# Patient Record
Sex: FEMALE | Race: ASIAN | NOT HISPANIC OR LATINO
[De-identification: names, ages, dates, MRNs, and addresses within clinical notes are randomized per-mention and may not be internally consistent; named-entity substitution may affect disease eponyms.]

---

## 2020-09-17 PROBLEM — Z00.00 ENCOUNTER FOR PREVENTIVE HEALTH EXAMINATION: Status: ACTIVE | Noted: 2020-09-17

## 2020-09-18 ENCOUNTER — APPOINTMENT (OUTPATIENT)
Dept: CARDIOLOGY | Facility: CLINIC | Age: 65
End: 2020-09-18
Payer: MEDICAID

## 2020-09-18 ENCOUNTER — NON-APPOINTMENT (OUTPATIENT)
Age: 65
End: 2020-09-18

## 2020-09-18 VITALS
BODY MASS INDEX: 26.4 KG/M2 | TEMPERATURE: 98 F | DIASTOLIC BLOOD PRESSURE: 70 MMHG | HEART RATE: 69 BPM | SYSTOLIC BLOOD PRESSURE: 126 MMHG | HEIGHT: 60.5 IN | OXYGEN SATURATION: 99 % | RESPIRATION RATE: 18 BRPM | WEIGHT: 138 LBS

## 2020-09-18 DIAGNOSIS — R06.02 SHORTNESS OF BREATH: ICD-10-CM

## 2020-09-18 DIAGNOSIS — R00.2 PALPITATIONS: ICD-10-CM

## 2020-09-18 PROCEDURE — 93306 TTE W/DOPPLER COMPLETE: CPT

## 2020-09-18 PROCEDURE — 99204 OFFICE O/P NEW MOD 45 MIN: CPT

## 2020-09-18 PROCEDURE — 93000 ELECTROCARDIOGRAM COMPLETE: CPT

## 2020-09-18 RX ORDER — ATORVASTATIN CALCIUM 20 MG/1
20 TABLET, FILM COATED ORAL
Refills: 0 | Status: ACTIVE | COMMUNITY

## 2020-09-18 RX ORDER — TELMISARTAN 40 MG/1
40 TABLET ORAL
Refills: 0 | Status: ACTIVE | COMMUNITY

## 2020-09-18 RX ORDER — ATORVASTATIN CALCIUM 20 MG/1
20 TABLET, FILM COATED ORAL DAILY
Qty: 30 | Refills: 5 | Status: ACTIVE | COMMUNITY
Start: 1900-01-01 | End: 1900-01-01

## 2020-09-18 RX ORDER — METOPROLOL SUCCINATE 25 MG/1
25 TABLET, EXTENDED RELEASE ORAL DAILY
Qty: 30 | Refills: 5 | Status: ACTIVE | COMMUNITY
Start: 1900-01-01 | End: 1900-01-01

## 2020-09-18 RX ORDER — TELMISARTAN 40 MG/1
40 TABLET ORAL
Qty: 30 | Refills: 5 | Status: ACTIVE | COMMUNITY
Start: 1900-01-01 | End: 1900-01-01

## 2020-09-18 RX ORDER — ASPIRIN ENTERIC COATED TABLETS 81 MG 81 MG/1
81 TABLET, DELAYED RELEASE ORAL
Refills: 0 | Status: ACTIVE | COMMUNITY

## 2020-09-18 RX ORDER — AMLODIPINE BESYLATE 5 MG/1
5 TABLET ORAL
Refills: 0 | Status: ACTIVE | COMMUNITY

## 2020-09-18 RX ORDER — METOPROLOL SUCCINATE 25 MG/1
25 TABLET, EXTENDED RELEASE ORAL
Refills: 0 | Status: ACTIVE | COMMUNITY

## 2020-09-18 RX ORDER — AMIODARONE HYDROCHLORIDE 200 MG/1
200 TABLET ORAL
Refills: 0 | Status: ACTIVE | COMMUNITY

## 2020-09-18 RX ORDER — AMIODARONE HYDROCHLORIDE 200 MG/1
200 TABLET ORAL
Qty: 30 | Refills: 5 | Status: ACTIVE | COMMUNITY
Start: 1900-01-01 | End: 1900-01-01

## 2020-09-18 RX ORDER — AMLODIPINE BESYLATE 5 MG/1
5 TABLET ORAL DAILY
Qty: 30 | Refills: 5 | Status: ACTIVE | COMMUNITY
Start: 1900-01-01 | End: 1900-01-01

## 2020-09-20 PROBLEM — R06.02 SHORTNESS OF BREATH: Status: ACTIVE | Noted: 2020-09-18

## 2020-09-22 NOTE — REASON FOR VISIT
[FreeTextEntry1] : 65 year-old British female with HTN, CAD presents for evaluation of CP. Patient reports CP and SOB with exertion. She reports palpitations. Patient denies h/o syncope. Patient is on Amiodarone 200 mg, Amlodipine 5 mg, ASA 81 mg, Metoprolol ER 25 mg, Telmisartan 40 mg. Atorvastatin 20 mg. I advised patient to undergo an echocardiogram. I advised patient to wear a Holter monitor.

## 2020-09-22 NOTE — PHYSICAL EXAM
[General Appearance - Well Developed] : well developed [Normal Appearance] : normal appearance [Well Groomed] : well groomed [General Appearance - Well Nourished] : well nourished [No Deformities] : no deformities [General Appearance - In No Acute Distress] : no acute distress [Normal Conjunctiva] : the conjunctiva exhibited no abnormalities [Eyelids - No Xanthelasma] : the eyelids demonstrated no xanthelasmas [Normal Oral Mucosa] : normal oral mucosa [No Oral Pallor] : no oral pallor [No Oral Cyanosis] : no oral cyanosis [Normal Jugular Venous A Waves Present] : normal jugular venous A waves present [Normal Jugular Venous V Waves Present] : normal jugular venous V waves present [No Jugular Venous Grover A Waves] : no jugular venous grover A waves [Heart Rate And Rhythm] : heart rate and rhythm were normal [Heart Sounds] : normal S1 and S2 [Murmurs] : no murmurs present [Respiration, Rhythm And Depth] : normal respiratory rhythm and effort [Exaggerated Use Of Accessory Muscles For Inspiration] : no accessory muscle use [Auscultation Breath Sounds / Voice Sounds] : lungs were clear to auscultation bilaterally [Abdomen Soft] : soft [Abdomen Tenderness] : non-tender [Abdomen Mass (___ Cm)] : no abdominal mass palpated [Abnormal Walk] : normal gait [Gait - Sufficient For Exercise Testing] : the gait was sufficient for exercise testing [Nail Clubbing] : no clubbing of the fingernails [Cyanosis, Localized] : no localized cyanosis [Petechial Hemorrhages (___cm)] : no petechial hemorrhages [] : no ischemic changes [Oriented To Time, Place, And Person] : oriented to person, place, and time [Affect] : the affect was normal [Mood] : the mood was normal [No Anxiety] : not feeling anxious

## 2020-09-23 ENCOUNTER — NON-APPOINTMENT (OUTPATIENT)
Age: 65
End: 2020-09-23

## 2024-06-06 PROBLEM — E78.5 HLD (HYPERLIPIDEMIA): Status: ACTIVE | Noted: 2020-09-18

## 2024-06-06 PROBLEM — I48.0 PAROXYSMAL ATRIAL FIBRILLATION: Status: ACTIVE | Noted: 2020-09-20

## 2024-06-06 PROBLEM — I10 HTN (HYPERTENSION): Status: ACTIVE | Noted: 2020-09-18

## 2024-06-07 ENCOUNTER — APPOINTMENT (OUTPATIENT)
Dept: CARDIOLOGY | Facility: CLINIC | Age: 69
End: 2024-06-07
Payer: MEDICAID

## 2024-06-07 ENCOUNTER — NON-APPOINTMENT (OUTPATIENT)
Age: 69
End: 2024-06-07

## 2024-06-07 VITALS
DIASTOLIC BLOOD PRESSURE: 70 MMHG | RESPIRATION RATE: 18 BRPM | SYSTOLIC BLOOD PRESSURE: 124 MMHG | HEART RATE: 52 BPM | OXYGEN SATURATION: 97 % | BODY MASS INDEX: 25.93 KG/M2 | WEIGHT: 135 LBS

## 2024-06-07 DIAGNOSIS — E78.5 HYPERLIPIDEMIA, UNSPECIFIED: ICD-10-CM

## 2024-06-07 DIAGNOSIS — I48.0 PAROXYSMAL ATRIAL FIBRILLATION: ICD-10-CM

## 2024-06-07 DIAGNOSIS — I10 ESSENTIAL (PRIMARY) HYPERTENSION: ICD-10-CM

## 2024-06-07 PROCEDURE — 93000 ELECTROCARDIOGRAM COMPLETE: CPT

## 2024-06-07 PROCEDURE — 93306 TTE W/DOPPLER COMPLETE: CPT

## 2024-06-07 PROCEDURE — 99204 OFFICE O/P NEW MOD 45 MIN: CPT | Mod: 25

## 2024-06-11 NOTE — REASON FOR VISIT
[FreeTextEntry1] : 68 year-old [place] female with CAD, PAF, HTN, presents for followup.    Patient was last seen on 9/18/20 for evaluation of CP.  Patient reports CP and SOB with exertion. She reports palpitations. Patient denies h/o syncope. Patient is on Amiodarone 200 mg, Amlodipine 5 mg, ASA 81 mg, Metoprolol ER 25 mg, Telmisartan 40 mg. Atorvastatin 20 mg. I advised patient to undergo an echocardiogram. I advised patient to wear a Holter monitor.  Patient underwent an echocardiogram and it showed normal LV function without significant valvular pathology.  Patient wore a Holter and it showed occasional APC's, without significant arrhythmia. No PAF noted.  Patient is on Amiodarone 200 mg, Amlodipine 5 mg, ASA 81 mg, Metoprolol ER 25 mg, Telmisartan 40 mg. Atorvastatin 20 mg.

## 2024-06-11 NOTE — HISTORY OF PRESENT ILLNESS
[FreeTextEntry1] : 6/7/24- Patient denies CP or SOB. He reports palpitations but is okay. He reports dizziness and fatigue. I advised patient to stop taking Amiordarone and start on Multaq 400 mg BID for one month. I advised patient to consider ablation if Multaq 400 mg BID does not work. I advised patient to undergo an echocardiogram.  FU in one month.   9/18/20 - Patient reports CP and SOB with exertion. She reports palpitations. Patient denies h/o syncope. Patient is on Amiodarone 200 mg, Amlodipine 5 mg, ASA 81 mg, Metoprolol ER 25 mg, Telmisartan 40 mg. Atorvastatin 20 mg. I advised patient to undergo an echocardiogram. I advised patient to wear a Holter monitor.  Patient underwent an echocardiogram and it showed normal LV function without significant valvular pathology.  Patient wore a Holter and it showed occasional APC's, without significant arrhythmia. No PAF noted.

## 2024-07-05 ENCOUNTER — APPOINTMENT (OUTPATIENT)
Dept: CARDIOLOGY | Facility: CLINIC | Age: 69
End: 2024-07-05
Payer: MEDICAID

## 2024-07-05 ENCOUNTER — APPOINTMENT (OUTPATIENT)
Dept: CARDIOLOGY | Facility: CLINIC | Age: 69
End: 2024-07-05

## 2024-07-05 VITALS
OXYGEN SATURATION: 99 % | BODY MASS INDEX: 25.36 KG/M2 | RESPIRATION RATE: 16 BRPM | WEIGHT: 132 LBS | HEART RATE: 90 BPM | DIASTOLIC BLOOD PRESSURE: 58 MMHG | SYSTOLIC BLOOD PRESSURE: 107 MMHG

## 2024-07-05 DIAGNOSIS — R06.02 SHORTNESS OF BREATH: ICD-10-CM

## 2024-07-05 DIAGNOSIS — I48.0 PAROXYSMAL ATRIAL FIBRILLATION: ICD-10-CM

## 2024-07-05 DIAGNOSIS — I10 ESSENTIAL (PRIMARY) HYPERTENSION: ICD-10-CM

## 2024-07-05 PROCEDURE — 99214 OFFICE O/P EST MOD 30 MIN: CPT

## 2024-07-05 PROCEDURE — 93015 CV STRESS TEST SUPVJ I&R: CPT

## 2024-07-05 RX ORDER — LOSARTAN POTASSIUM 50 MG/1
50 TABLET, FILM COATED ORAL
Refills: 0 | Status: ACTIVE | COMMUNITY

## 2024-07-05 RX ORDER — DRONEDARONE 400 MG/1
400 TABLET, FILM COATED ORAL TWICE DAILY
Qty: 60 | Refills: 5 | Status: ACTIVE | COMMUNITY
Start: 2024-07-05 | End: 1900-01-01

## 2024-08-05 PROBLEM — I42.2 APICAL VARIANT HYPERTROPHIC CARDIOMYOPATHY: Status: ACTIVE | Noted: 2024-08-05

## 2024-08-06 ENCOUNTER — APPOINTMENT (OUTPATIENT)
Dept: CARDIOLOGY | Facility: CLINIC | Age: 69
End: 2024-08-06

## 2024-08-06 PROCEDURE — 99214 OFFICE O/P EST MOD 30 MIN: CPT

## 2024-08-06 NOTE — REASON FOR VISIT
[FreeTextEntry1] : 69 year-old German female with CAD, PAF, apical hypertrophic cardiomyopathy, HTN, presents for followup.    Patient was last seen on 7/5/24 -  Pt is here for follow up. Pt reports improvement after stopped taking Amiodarone. Patient denies CP, SOB, palpitations, or lightheadedness. Patient denies h/o syncope. Pt denies any bleeding issues with ASA.  Pt is on Amlodipine 5 mg, ASA 81 mg, Metoprolol ER 25 mg, Losartan 50 mg. Atorvastatin 20 mg.  Her home SBP ranged 105-115, HR ranged 52-58 bpm. Today's /58 P 57.  Patient has low BP. I advised patient to change Metoprolol ER 25 mg to Multaq 400 mg BID and decrease dose of Amlodipine to 2.5mg. Patient may need PPM in the future. I advised patient to undergo stress test.   Patient underwent a treadmill stress test and completed 6 minutes of Matti protocol.  There was no ECG evidence of ischemia.  Following treadmill stress, there was no echocardiographic evidence of ischemia.  However, patient was unable to reach target HR.  There appeared to be hypertrophy of the apex, distal inferior, and distal lateral walls.  Pt is on Amlodipine 2.5 mg for HTN.  She is on ASA 81 mg, Multaq 400 mg BID for PAF.  She is on Atorvastatin 20 mg for HLD.   Patient underwent an echocardiogram 9/18/20 and it showed normal LV function without significant valvular pathology.    Patient wore a Holter 9/18/20 and it showed occasional APC's, without significant arrhythmia. No PAF noted.

## 2024-08-06 NOTE — HISTORY OF PRESENT ILLNESS
[FreeTextEntry1] : 8/6/24 - Patient reports feeling better after changing Metoprolol ER 25 mg to Multaq 400 mg BID and decreasing dose of Amlodipine to 2.5mg.  Patient reports decreased dizziness.  Patient denies CP, SOB, or palpitations.  Pt is on Amlodipine 2.5 mg for HTN.  She is on ASA 81 mg, Multaq 400 mg BID for PAF.  She is on Atorvastatin 20 mg for HLD.   /76 HR 62.  Exam unremarkable.   I advised patient to wear a 7-day event monitor.  If PAF present, I will switch ASA 81 mg to Eliquis.  I advised patient to continue current medications.  FU 3 months.   7/5/24- Please refer to NP note below. Pt is here for follow up. Pt reports improvement after stopped taking Amiodarone. Patient denies CP, SOB, palpitations, or lightheadedness. Patient denies h/o syncope. Pt denies any bleeding issues with ASA.  Pt is on Amlodipine 5 mg, ASA 81 mg, Metoprolol ER 25 mg, Losartan 50 mg. Atorvastatin 20 mg.  Her home SBP ranged 105-115, HR ranged 52-58 bpm. Today's /58 P 57.  Patient has low BP. I advised patient to change Metoprolol ER 25 mg to Multaq 400 mg BID and decrease dose of Amlodipine to 2.5mg. Patient may need PPM in the future. I advised patient to undergo stress test.   Patient underwent a treadmill stress test and completed 6 minutes of Matti protocol.  There was no ECG evidence of ischemia.  Following treadmill stress, there was no echocardiographic evidence of ischemia.  However, patient was unable to reach target HR.  There appeared to be hypertrophy of the apex, distal inferior, and distal lateral walls.  6/7/24- Patient denies CP or SOB. He reports palpitations but is okay. He reports dizziness and fatigue. I advised patient to stop taking Amiordarone and start on Multaq 400 mg BID for one month. I advised patient to consider ablation if Multaq 400 mg BID does not work. I advised patient to undergo an echocardiogram.  FU in one month.   9/18/20 - Patient reports CP and SOB with exertion. She reports palpitations. Patient denies h/o syncope. Patient is on Amiodarone 200 mg, Amlodipine 5 mg, ASA 81 mg, Metoprolol ER 25 mg, Telmisartan 40 mg. Atorvastatin 20 mg. I advised patient to undergo an echocardiogram. I advised patient to wear a Holter monitor.  Patient underwent an echocardiogram and it showed normal LV function without significant valvular pathology.  Patient wore a Holter and it showed occasional APC's, without significant arrhythmia. No PAF noted.

## 2024-08-21 ENCOUNTER — NON-APPOINTMENT (OUTPATIENT)
Age: 69
End: 2024-08-21

## 2024-12-18 ENCOUNTER — APPOINTMENT (OUTPATIENT)
Dept: CARDIOLOGY | Facility: CLINIC | Age: 69
End: 2024-12-18
Payer: MEDICAID

## 2024-12-18 VITALS
BODY MASS INDEX: 25.55 KG/M2 | WEIGHT: 133 LBS | HEART RATE: 61 BPM | DIASTOLIC BLOOD PRESSURE: 72 MMHG | OXYGEN SATURATION: 96 % | SYSTOLIC BLOOD PRESSURE: 158 MMHG | RESPIRATION RATE: 18 BRPM

## 2024-12-18 DIAGNOSIS — I42.2 OTHER HYPERTROPHIC CARDIOMYOPATHY: ICD-10-CM

## 2024-12-18 DIAGNOSIS — I48.0 PAROXYSMAL ATRIAL FIBRILLATION: ICD-10-CM

## 2024-12-18 DIAGNOSIS — I10 ESSENTIAL (PRIMARY) HYPERTENSION: ICD-10-CM

## 2024-12-18 PROCEDURE — 99214 OFFICE O/P EST MOD 30 MIN: CPT

## 2025-06-02 ENCOUNTER — APPOINTMENT (OUTPATIENT)
Dept: CARDIOLOGY | Facility: CLINIC | Age: 70
End: 2025-06-02
Payer: MEDICAID

## 2025-06-02 ENCOUNTER — NON-APPOINTMENT (OUTPATIENT)
Age: 70
End: 2025-06-02

## 2025-06-02 VITALS
HEART RATE: 68 BPM | WEIGHT: 131 LBS | SYSTOLIC BLOOD PRESSURE: 147 MMHG | DIASTOLIC BLOOD PRESSURE: 68 MMHG | OXYGEN SATURATION: 97 % | RESPIRATION RATE: 18 BRPM | BODY MASS INDEX: 25.16 KG/M2

## 2025-06-02 DIAGNOSIS — I10 ESSENTIAL (PRIMARY) HYPERTENSION: ICD-10-CM

## 2025-06-02 DIAGNOSIS — I42.2 OTHER HYPERTROPHIC CARDIOMYOPATHY: ICD-10-CM

## 2025-06-02 DIAGNOSIS — I48.0 PAROXYSMAL ATRIAL FIBRILLATION: ICD-10-CM

## 2025-06-02 PROCEDURE — 93000 ELECTROCARDIOGRAM COMPLETE: CPT

## 2025-06-02 PROCEDURE — 99214 OFFICE O/P EST MOD 30 MIN: CPT | Mod: 25

## 2025-06-02 PROCEDURE — 93306 TTE W/DOPPLER COMPLETE: CPT
